# Patient Record
Sex: MALE | Race: WHITE | Employment: STUDENT | URBAN - METROPOLITAN AREA
[De-identification: names, ages, dates, MRNs, and addresses within clinical notes are randomized per-mention and may not be internally consistent; named-entity substitution may affect disease eponyms.]

---
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
## 2021-03-27 ENCOUNTER — OFFICE VISIT (OUTPATIENT)
Dept: FAMILY MEDICINE CLINIC | Facility: CLINIC | Age: 21
End: 2021-03-27
Payer: COMMERCIAL

## 2021-03-27 ENCOUNTER — LAB ENCOUNTER (OUTPATIENT)
Dept: LAB | Facility: HOSPITAL | Age: 21
End: 2021-03-27
Attending: STUDENT IN AN ORGANIZED HEALTH CARE EDUCATION/TRAINING PROGRAM
Payer: COMMERCIAL

## 2021-03-27 VITALS
SYSTOLIC BLOOD PRESSURE: 108 MMHG | HEART RATE: 65 BPM | WEIGHT: 190 LBS | TEMPERATURE: 98 F | DIASTOLIC BLOOD PRESSURE: 60 MMHG | BODY MASS INDEX: 25.18 KG/M2 | HEIGHT: 73 IN

## 2021-03-27 DIAGNOSIS — R41.840 DECREASED ATTENTION SPAN: ICD-10-CM

## 2021-03-27 DIAGNOSIS — R41.840 DECREASED ATTENTION SPAN: Primary | ICD-10-CM

## 2021-03-27 LAB
ALBUMIN SERPL-MCNC: 4.1 G/DL (ref 3.4–5)
ALBUMIN/GLOB SERPL: 1.4 {RATIO} (ref 1–2)
ALP LIVER SERPL-CCNC: 63 U/L
ALT SERPL-CCNC: 41 U/L
ANION GAP SERPL CALC-SCNC: 4 MMOL/L (ref 0–18)
AST SERPL-CCNC: 52 U/L (ref 15–37)
BILIRUB SERPL-MCNC: 0.7 MG/DL (ref 0.1–2)
BUN BLD-MCNC: 10 MG/DL (ref 7–18)
BUN/CREAT SERPL: 7.1 (ref 10–20)
CALCIUM BLD-MCNC: 9.3 MG/DL (ref 8.5–10.1)
CHLORIDE SERPL-SCNC: 106 MMOL/L (ref 98–112)
CO2 SERPL-SCNC: 30 MMOL/L (ref 21–32)
CREAT BLD-MCNC: 1.4 MG/DL
DEPRECATED RDW RBC AUTO: 42.8 FL (ref 35.1–46.3)
ERYTHROCYTE [DISTWIDTH] IN BLOOD BY AUTOMATED COUNT: 13.2 % (ref 11–15)
GLOBULIN PLAS-MCNC: 3 G/DL (ref 2.8–4.4)
GLUCOSE BLD-MCNC: 97 MG/DL (ref 70–99)
HCT VFR BLD AUTO: 46.1 %
HGB BLD-MCNC: 15.7 G/DL
M PROTEIN MFR SERPL ELPH: 7.1 G/DL (ref 6.4–8.2)
MCH RBC QN AUTO: 29.9 PG (ref 26–34)
MCHC RBC AUTO-ENTMCNC: 34.1 G/DL (ref 31–37)
MCV RBC AUTO: 87.8 FL
OSMOLALITY SERPL CALC.SUM OF ELEC: 289 MOSM/KG (ref 275–295)
PATIENT FASTING Y/N/NP: NO
PLATELET # BLD AUTO: 204 10(3)UL (ref 150–450)
POTASSIUM SERPL-SCNC: 4.6 MMOL/L (ref 3.5–5.1)
RBC # BLD AUTO: 5.25 X10(6)UL
SODIUM SERPL-SCNC: 140 MMOL/L (ref 136–145)
TSI SER-ACNC: 2.64 MIU/ML (ref 0.36–3.74)
WBC # BLD AUTO: 5.5 X10(3) UL (ref 4–11)

## 2021-03-27 PROCEDURE — 93005 ELECTROCARDIOGRAM TRACING: CPT

## 2021-03-27 PROCEDURE — 80053 COMPREHEN METABOLIC PANEL: CPT

## 2021-03-27 PROCEDURE — 84443 ASSAY THYROID STIM HORMONE: CPT

## 2021-03-27 PROCEDURE — 85027 COMPLETE CBC AUTOMATED: CPT

## 2021-03-27 PROCEDURE — 99202 OFFICE O/P NEW SF 15 MIN: CPT | Performed by: STUDENT IN AN ORGANIZED HEALTH CARE EDUCATION/TRAINING PROGRAM

## 2021-03-27 PROCEDURE — 3008F BODY MASS INDEX DOCD: CPT | Performed by: STUDENT IN AN ORGANIZED HEALTH CARE EDUCATION/TRAINING PROGRAM

## 2021-03-27 PROCEDURE — 36415 COLL VENOUS BLD VENIPUNCTURE: CPT

## 2021-03-27 PROCEDURE — 93010 ELECTROCARDIOGRAM REPORT: CPT | Performed by: STUDENT IN AN ORGANIZED HEALTH CARE EDUCATION/TRAINING PROGRAM

## 2021-03-27 PROCEDURE — 3078F DIAST BP <80 MM HG: CPT | Performed by: STUDENT IN AN ORGANIZED HEALTH CARE EDUCATION/TRAINING PROGRAM

## 2021-03-27 PROCEDURE — 3074F SYST BP LT 130 MM HG: CPT | Performed by: STUDENT IN AN ORGANIZED HEALTH CARE EDUCATION/TRAINING PROGRAM

## 2021-03-27 NOTE — PROGRESS NOTES
HPI:    Patient ID: Angelita Nguyen is a 21year old male. HPI  Pt presenting with attention concerns.  He states he has had issues with distractibility since approx age 15, recalls meetings between teachers and his parents to address assignments and test appearance. HENT:      Head: Normocephalic. Eyes:      Extraocular Movements: Extraocular movements intact. Conjunctiva/sclera: Conjunctivae normal.      Pupils: Pupils are equal, round, and reactive to light.    Neck:      Thyroid: No thyroid mass Visit:  Requested Prescriptions     Signed Prescriptions Disp Refills   • Amphetamine-Dextroamphet ER (ADDERALL XR) 5 MG Oral Capsule SR 24 Hr 30 capsule 0     Sig: Take 1 capsule (5 mg total) by mouth every morning.        Imaging & Referrals:  EKG 12-LEAD

## 2021-03-28 RX ORDER — DEXTROAMPHETAMINE SACCHARATE, AMPHETAMINE ASPARTATE MONOHYDRATE, DEXTROAMPHETAMINE SULFATE AND AMPHETAMINE SULFATE 1.25; 1.25; 1.25; 1.25 MG/1; MG/1; MG/1; MG/1
5 CAPSULE, EXTENDED RELEASE ORAL EVERY MORNING
Qty: 30 CAPSULE | Refills: 0 | Status: SHIPPED | OUTPATIENT
Start: 2021-03-28 | End: 2021-04-27

## 2021-03-29 ENCOUNTER — VIRTUAL PHONE E/M (OUTPATIENT)
Dept: FAMILY MEDICINE CLINIC | Facility: CLINIC | Age: 21
End: 2021-03-29
Payer: COMMERCIAL

## 2021-03-29 DIAGNOSIS — R41.840 DECREASED ATTENTION SPAN: Primary | ICD-10-CM

## 2021-04-23 NOTE — PROGRESS NOTES
Virtual Telephone Check-In    Livia Ngo verbally consents to a Virtual/Telephone Check-In visit on 04/23/21. Patient has been referred to the [facility] website at [email or web address] to review the yearly Consent to Treat document.     Patient Jag Merino the plan of care above. Coding/billing information is submitted for this visit based on complexity of care and/or time spent for the visit. HPI:    Patient ID: Noe Canada is a 21year old male. HPI  Pt presenting via phone for result review.  Rec agrees with plan. No orders of the defined types were placed in this encounter.       Meds This Visit:  Requested Prescriptions      No prescriptions requested or ordered in this encounter       Imaging & Referrals:  None         BE#[id number]

## 2021-07-26 ENCOUNTER — HOSPITAL ENCOUNTER (OUTPATIENT)
Age: 21
Discharge: HOME | End: 2021-07-26
Payer: COMMERCIAL

## 2021-07-26 VITALS
TEMPERATURE: 99 F | HEIGHT: 73 IN | DIASTOLIC BLOOD PRESSURE: 67 MMHG | OXYGEN SATURATION: 100 % | BODY MASS INDEX: 25.18 KG/M2 | HEART RATE: 65 BPM | WEIGHT: 190 LBS | RESPIRATION RATE: 18 BRPM | SYSTOLIC BLOOD PRESSURE: 129 MMHG

## 2021-07-26 DIAGNOSIS — J02.0 STREPTOCOCCAL SORE THROAT: Primary | ICD-10-CM

## 2021-07-26 LAB
BILIRUB UR QL STRIP: NEGATIVE
CLARITY UR: CLEAR
COLOR UR: YELLOW
GLUCOSE UR STRIP-MCNC: NEGATIVE MG/DL
HGB UR QL STRIP: NEGATIVE
KETONES UR STRIP-MCNC: NEGATIVE MG/DL
LEUKOCYTE ESTERASE UR QL STRIP: NEGATIVE
NITRITE UR QL STRIP: NEGATIVE
PH UR STRIP: 6.5 [PH]
PROT UR STRIP-MCNC: NEGATIVE MG/DL
S PYO AG THROAT QL: POSITIVE
SP GR UR STRIP: >=1.03
UROBILINOGEN UR STRIP-ACNC: <2 MG/DL

## 2021-07-26 PROCEDURE — 81002 URINALYSIS NONAUTO W/O SCOPE: CPT | Performed by: NURSE PRACTITIONER

## 2021-07-26 PROCEDURE — 87880 STREP A ASSAY W/OPTIC: CPT | Performed by: NURSE PRACTITIONER

## 2021-07-26 PROCEDURE — 99203 OFFICE O/P NEW LOW 30 MIN: CPT | Performed by: NURSE PRACTITIONER

## 2021-07-26 RX ORDER — AMOXICILLIN 875 MG/1
875 TABLET, COATED ORAL 2 TIMES DAILY
Qty: 20 TABLET | Refills: 0 | Status: SHIPPED | OUTPATIENT
Start: 2021-07-26 | End: 2021-08-05 | Stop reason: ALTCHOICE

## 2021-07-26 NOTE — ED PROVIDER NOTES
Patient presents with:  Std Screen      HPI:     Vasile Cross is a 21year old male who presents for evaluation of a chief complaint of sore throat for the past week. No difficulty swallowing. Speech is clear. No difficulty breathing or stridor.   He s Needs:       Lack of Transportation (Medical):       Lack of Transportation (Non-Medical):   Physical Activity:       Days of Exercise per Week:       Minutes of Exercise per Session:   Stress:       Feeling of Stress :   Social Connections:       Sim Bro for 10 days.           Dispense:  20 tablet          Refill:  0      Labs performed this visit:  Recent Results (from the past 10 hour(s))   POCT Urinalysis Dipstick    Collection Time: 07/26/21  2:48 PM   Result Value Ref Range    Urine Color Yellow Yellow

## 2021-07-27 LAB
C TRACH DNA SPEC QL NAA+PROBE: NEGATIVE
N GONORRHOEA DNA SPEC QL NAA+PROBE: NEGATIVE

## 2021-08-05 ENCOUNTER — OFFICE VISIT (OUTPATIENT)
Dept: FAMILY MEDICINE CLINIC | Facility: CLINIC | Age: 21
End: 2021-08-05
Payer: COMMERCIAL

## 2021-08-05 VITALS
SYSTOLIC BLOOD PRESSURE: 117 MMHG | TEMPERATURE: 98 F | DIASTOLIC BLOOD PRESSURE: 66 MMHG | WEIGHT: 194 LBS | BODY MASS INDEX: 25.71 KG/M2 | HEART RATE: 66 BPM | HEIGHT: 73 IN

## 2021-08-05 DIAGNOSIS — Z23 IMMUNIZATION DUE: ICD-10-CM

## 2021-08-05 DIAGNOSIS — R41.840 DECREASED ATTENTION SPAN: Primary | ICD-10-CM

## 2021-08-05 PROCEDURE — 90471 IMMUNIZATION ADMIN: CPT | Performed by: STUDENT IN AN ORGANIZED HEALTH CARE EDUCATION/TRAINING PROGRAM

## 2021-08-05 PROCEDURE — 90651 9VHPV VACCINE 2/3 DOSE IM: CPT | Performed by: STUDENT IN AN ORGANIZED HEALTH CARE EDUCATION/TRAINING PROGRAM

## 2021-08-05 PROCEDURE — 3078F DIAST BP <80 MM HG: CPT | Performed by: STUDENT IN AN ORGANIZED HEALTH CARE EDUCATION/TRAINING PROGRAM

## 2021-08-05 PROCEDURE — 3008F BODY MASS INDEX DOCD: CPT | Performed by: STUDENT IN AN ORGANIZED HEALTH CARE EDUCATION/TRAINING PROGRAM

## 2021-08-05 PROCEDURE — 3074F SYST BP LT 130 MM HG: CPT | Performed by: STUDENT IN AN ORGANIZED HEALTH CARE EDUCATION/TRAINING PROGRAM

## 2021-08-05 PROCEDURE — 99213 OFFICE O/P EST LOW 20 MIN: CPT | Performed by: STUDENT IN AN ORGANIZED HEALTH CARE EDUCATION/TRAINING PROGRAM

## 2021-08-05 RX ORDER — DEXTROAMPHETAMINE SACCHARATE, AMPHETAMINE ASPARTATE MONOHYDRATE, DEXTROAMPHETAMINE SULFATE AND AMPHETAMINE SULFATE 1.25; 1.25; 1.25; 1.25 MG/1; MG/1; MG/1; MG/1
5 CAPSULE, EXTENDED RELEASE ORAL EVERY MORNING
COMMUNITY
End: 2021-08-05 | Stop reason: DRUGHIGH

## 2021-08-05 RX ORDER — DEXTROAMPHETAMINE SACCHARATE, AMPHETAMINE ASPARTATE MONOHYDRATE, DEXTROAMPHETAMINE SULFATE AND AMPHETAMINE SULFATE 5; 5; 5; 5 MG/1; MG/1; MG/1; MG/1
20 CAPSULE, EXTENDED RELEASE ORAL EVERY MORNING
Qty: 30 CAPSULE | Refills: 0 | Status: SHIPPED | OUTPATIENT
Start: 2021-08-05 | End: 2021-10-05

## 2021-08-05 NOTE — PROGRESS NOTES
HPI:    Patient ID: Michael Weaver is a 21year old male. HPI  Pt presenting for medication follow-up. H/o decreased attention related to ADD. He was started on Adderall Spring 2021, with minimal improvement.  He takes medication during the week for scho Cervical: No cervical adenopathy. Skin:     General: Skin is warm and dry. Coloration: Skin is not jaundiced. Neurological:      General: No focal deficit present. Mental Status: He is alert and oriented to person, place, and time.  Menta

## 2021-10-05 DIAGNOSIS — R41.840 DECREASED ATTENTION SPAN: ICD-10-CM

## 2021-10-06 DIAGNOSIS — R41.840 DECREASED ATTENTION SPAN: ICD-10-CM

## 2021-10-06 RX ORDER — DEXTROAMPHETAMINE SACCHARATE, AMPHETAMINE ASPARTATE MONOHYDRATE, DEXTROAMPHETAMINE SULFATE AND AMPHETAMINE SULFATE 5; 5; 5; 5 MG/1; MG/1; MG/1; MG/1
20 CAPSULE, EXTENDED RELEASE ORAL EVERY MORNING
Qty: 30 CAPSULE | Refills: 0 | Status: SHIPPED | OUTPATIENT
Start: 2021-10-06 | End: 2021-10-08

## 2021-10-06 RX ORDER — DEXTROAMPHETAMINE SACCHARATE, AMPHETAMINE ASPARTATE MONOHYDRATE, DEXTROAMPHETAMINE SULFATE AND AMPHETAMINE SULFATE 5; 5; 5; 5 MG/1; MG/1; MG/1; MG/1
20 CAPSULE, EXTENDED RELEASE ORAL EVERY MORNING
Qty: 30 CAPSULE | Refills: 0 | OUTPATIENT
Start: 2021-10-06

## 2021-10-06 NOTE — TELEPHONE ENCOUNTER
Please review refill protocol failed/ no protocol  Requested Prescriptions   Pending Prescriptions Disp Refills    Amphetamine-Dextroamphet ER 20 MG Oral Capsule SR 24 Hr 30 capsule 0     Sig: Take 1 capsule (20 mg total) by mouth every morning.         The

## 2021-10-08 RX ORDER — DEXTROAMPHETAMINE SACCHARATE, AMPHETAMINE ASPARTATE MONOHYDRATE, DEXTROAMPHETAMINE SULFATE AND AMPHETAMINE SULFATE 5; 5; 5; 5 MG/1; MG/1; MG/1; MG/1
20 CAPSULE, EXTENDED RELEASE ORAL EVERY MORNING
Qty: 30 CAPSULE | Refills: 0 | Status: SHIPPED | OUTPATIENT
Start: 2021-10-08 | End: 2021-11-06

## 2021-10-08 NOTE — TELEPHONE ENCOUNTER
DR Christopher Alvarez =see medication record, previous order dated 10/6/21 never sent to the pharmacy, pended new script.

## 2021-10-08 NOTE — TELEPHONE ENCOUNTER
Patient is calling regarding refill, states pharmacy has not received. Patient is completely out pf medication. Please call when medication is sent.

## 2021-11-06 DIAGNOSIS — R41.840 DECREASED ATTENTION SPAN: ICD-10-CM

## 2021-11-07 RX ORDER — DEXTROAMPHETAMINE SACCHARATE, AMPHETAMINE ASPARTATE MONOHYDRATE, DEXTROAMPHETAMINE SULFATE AND AMPHETAMINE SULFATE 5; 5; 5; 5 MG/1; MG/1; MG/1; MG/1
20 CAPSULE, EXTENDED RELEASE ORAL EVERY MORNING
Qty: 30 CAPSULE | Refills: 0 | Status: SHIPPED | OUTPATIENT
Start: 2021-11-07 | End: 2021-12-07

## 2021-12-07 DIAGNOSIS — R41.840 DECREASED ATTENTION SPAN: ICD-10-CM

## 2021-12-11 RX ORDER — DEXTROAMPHETAMINE SACCHARATE, AMPHETAMINE ASPARTATE MONOHYDRATE, DEXTROAMPHETAMINE SULFATE AND AMPHETAMINE SULFATE 5; 5; 5; 5 MG/1; MG/1; MG/1; MG/1
20 CAPSULE, EXTENDED RELEASE ORAL EVERY MORNING
Qty: 30 CAPSULE | Refills: 0 | Status: SHIPPED | OUTPATIENT
Start: 2021-12-11 | End: 2022-01-29

## 2022-01-29 DIAGNOSIS — R41.840 DECREASED ATTENTION SPAN: ICD-10-CM

## 2022-01-29 NOTE — TELEPHONE ENCOUNTER
Please review. Protocol failed or has no protocol. Requested Prescriptions   Pending Prescriptions Disp Refills    Amphetamine-Dextroamphet ER 20 MG Oral Capsule SR 24 Hr 30 capsule 0     Sig: Take 1 capsule (20 mg total) by mouth every morning.

## 2022-01-31 RX ORDER — DEXTROAMPHETAMINE SACCHARATE, AMPHETAMINE ASPARTATE MONOHYDRATE, DEXTROAMPHETAMINE SULFATE AND AMPHETAMINE SULFATE 5; 5; 5; 5 MG/1; MG/1; MG/1; MG/1
20 CAPSULE, EXTENDED RELEASE ORAL EVERY MORNING
Qty: 30 CAPSULE | Refills: 0 | Status: SHIPPED | OUTPATIENT
Start: 2022-01-31

## 2022-02-23 DIAGNOSIS — R41.840 DECREASED ATTENTION SPAN: ICD-10-CM

## 2022-02-23 RX ORDER — DEXTROAMPHETAMINE SACCHARATE, AMPHETAMINE ASPARTATE MONOHYDRATE, DEXTROAMPHETAMINE SULFATE AND AMPHETAMINE SULFATE 5; 5; 5; 5 MG/1; MG/1; MG/1; MG/1
20 CAPSULE, EXTENDED RELEASE ORAL EVERY MORNING
Qty: 30 CAPSULE | Refills: 0 | Status: CANCELLED | OUTPATIENT
Start: 2022-02-23

## 2022-02-24 DIAGNOSIS — R41.840 DECREASED ATTENTION SPAN: ICD-10-CM

## 2022-02-24 RX ORDER — DEXTROAMPHETAMINE SACCHARATE, AMPHETAMINE ASPARTATE MONOHYDRATE, DEXTROAMPHETAMINE SULFATE AND AMPHETAMINE SULFATE 6.25; 6.25; 6.25; 6.25 MG/1; MG/1; MG/1; MG/1
25 CAPSULE, EXTENDED RELEASE ORAL EVERY MORNING
Qty: 30 CAPSULE | Refills: 0 | Status: CANCELLED | OUTPATIENT
Start: 2022-02-24

## 2022-04-04 RX ORDER — DEXTROAMPHETAMINE SACCHARATE, AMPHETAMINE ASPARTATE MONOHYDRATE, DEXTROAMPHETAMINE SULFATE AND AMPHETAMINE SULFATE 6.25; 6.25; 6.25; 6.25 MG/1; MG/1; MG/1; MG/1
25 CAPSULE, EXTENDED RELEASE ORAL EVERY MORNING
Qty: 30 CAPSULE | Refills: 0 | Status: SHIPPED | OUTPATIENT
Start: 2022-04-04

## 2022-04-29 NOTE — TELEPHONE ENCOUNTER
Please review. Protocol failed or has no protocol. Requested Prescriptions   Pending Prescriptions Disp Refills    Amphetamine-Dextroamphet ER 25 MG Oral Capsule SR 24 Hr 30 capsule 0     Sig: Take 1 capsule (25 mg total) by mouth every morning.         There is no refill protocol information for this order           Recent Outpatient Visits              8 months ago Decreased attention Span    Yamile Mccoy MD    Office Visit    1 year ago Decreased attention Span    Yamile Mccoy MD    Virtual Phone E/M    1 year ago Decreased attention Span    Yamile Mccoy MD    Office Visit    2 years ago Need for vaccination    [facility], [address], [place]    Nurse Only    3 years ago Pharyngitis, unspecified etiology    [facility] [place], [address], [facility] [place] [place]    Office Visit 3

## 2022-05-01 RX ORDER — DEXTROAMPHETAMINE SACCHARATE, AMPHETAMINE ASPARTATE MONOHYDRATE, DEXTROAMPHETAMINE SULFATE AND AMPHETAMINE SULFATE 6.25; 6.25; 6.25; 6.25 MG/1; MG/1; MG/1; MG/1
25 CAPSULE, EXTENDED RELEASE ORAL EVERY MORNING
Qty: 30 CAPSULE | Refills: 0 | Status: SHIPPED | OUTPATIENT
Start: 2022-05-01

## 2022-05-15 ENCOUNTER — OFFICE VISIT (OUTPATIENT)
Dept: FAMILY MEDICINE CLINIC | Facility: CLINIC | Age: 22
End: 2022-05-15
Payer: COMMERCIAL

## 2022-05-15 VITALS
WEIGHT: 197 LBS | DIASTOLIC BLOOD PRESSURE: 68 MMHG | SYSTOLIC BLOOD PRESSURE: 130 MMHG | BODY MASS INDEX: 25.28 KG/M2 | OXYGEN SATURATION: 100 % | RESPIRATION RATE: 18 BRPM | HEIGHT: 74 IN | HEART RATE: 78 BPM | TEMPERATURE: 100 F

## 2022-05-15 DIAGNOSIS — J11.1 INFLUENZA-LIKE ILLNESS: Primary | ICD-10-CM

## 2022-05-15 PROCEDURE — 87880 STREP A ASSAY W/OPTIC: CPT | Performed by: NURSE PRACTITIONER

## 2022-05-15 PROCEDURE — 3078F DIAST BP <80 MM HG: CPT | Performed by: NURSE PRACTITIONER

## 2022-05-15 PROCEDURE — 3008F BODY MASS INDEX DOCD: CPT | Performed by: NURSE PRACTITIONER

## 2022-05-15 PROCEDURE — 99213 OFFICE O/P EST LOW 20 MIN: CPT | Performed by: NURSE PRACTITIONER

## 2022-05-15 PROCEDURE — 3075F SYST BP GE 130 - 139MM HG: CPT | Performed by: NURSE PRACTITIONER

## 2022-06-08 ENCOUNTER — OFFICE VISIT (OUTPATIENT)
Dept: FAMILY MEDICINE CLINIC | Facility: CLINIC | Age: 22
End: 2022-06-08
Payer: COMMERCIAL

## 2022-06-08 VITALS
SYSTOLIC BLOOD PRESSURE: 135 MMHG | RESPIRATION RATE: 16 BRPM | DIASTOLIC BLOOD PRESSURE: 83 MMHG | BODY MASS INDEX: 24.77 KG/M2 | HEIGHT: 74 IN | HEART RATE: 80 BPM | WEIGHT: 193 LBS

## 2022-06-08 DIAGNOSIS — M25.562 CHRONIC PAIN OF LEFT KNEE: ICD-10-CM

## 2022-06-08 DIAGNOSIS — R41.840 DECREASED ATTENTION SPAN: Primary | ICD-10-CM

## 2022-06-08 DIAGNOSIS — G89.29 CHRONIC PAIN OF LEFT KNEE: ICD-10-CM

## 2022-06-08 PROCEDURE — 3008F BODY MASS INDEX DOCD: CPT | Performed by: STUDENT IN AN ORGANIZED HEALTH CARE EDUCATION/TRAINING PROGRAM

## 2022-06-08 PROCEDURE — 3079F DIAST BP 80-89 MM HG: CPT | Performed by: STUDENT IN AN ORGANIZED HEALTH CARE EDUCATION/TRAINING PROGRAM

## 2022-06-08 PROCEDURE — 99214 OFFICE O/P EST MOD 30 MIN: CPT | Performed by: STUDENT IN AN ORGANIZED HEALTH CARE EDUCATION/TRAINING PROGRAM

## 2022-06-08 PROCEDURE — 3075F SYST BP GE 130 - 139MM HG: CPT | Performed by: STUDENT IN AN ORGANIZED HEALTH CARE EDUCATION/TRAINING PROGRAM

## 2022-06-08 RX ORDER — DEXTROAMPHETAMINE SACCHARATE, AMPHETAMINE ASPARTATE MONOHYDRATE, DEXTROAMPHETAMINE SULFATE AND AMPHETAMINE SULFATE 3.75; 3.75; 3.75; 3.75 MG/1; MG/1; MG/1; MG/1
15 CAPSULE, EXTENDED RELEASE ORAL EVERY MORNING
Qty: 30 CAPSULE | Refills: 0 | Status: SHIPPED | OUTPATIENT
Start: 2022-06-08 | End: 2022-07-08

## 2022-07-25 DIAGNOSIS — R41.840 DECREASED ATTENTION SPAN: ICD-10-CM

## 2022-07-25 RX ORDER — DEXTROAMPHETAMINE SACCHARATE, AMPHETAMINE ASPARTATE MONOHYDRATE, DEXTROAMPHETAMINE SULFATE AND AMPHETAMINE SULFATE 6.25; 6.25; 6.25; 6.25 MG/1; MG/1; MG/1; MG/1
25 CAPSULE, EXTENDED RELEASE ORAL EVERY MORNING
Qty: 30 CAPSULE | Refills: 0 | Status: SHIPPED | OUTPATIENT
Start: 2022-07-25

## 2022-07-25 NOTE — LETTER
Date & Time: 6/25/2019, 11:25 PM  Patient: Jerrod Huber  Encounter Provider(s):    Sunita Watson MD       To Whom It May Concern:    Aj Booth was seen and treated in our department on 6/25/2019. He should not return to work until 6/27/19.     If y Hemoglobin A1c acceptable at 7.6, previous was 7.4.

## 2022-08-10 ENCOUNTER — TELEPHONE (OUTPATIENT)
Dept: PEDIATRICS CLINIC | Facility: CLINIC | Age: 22
End: 2022-08-10

## 2022-08-10 NOTE — TELEPHONE ENCOUNTER
Left a message informing the pt that I put his immunization record in the mail to his home address as requested

## 2022-09-05 DIAGNOSIS — R41.840 DECREASED ATTENTION SPAN: ICD-10-CM

## 2022-09-05 RX ORDER — DEXTROAMPHETAMINE SACCHARATE, AMPHETAMINE ASPARTATE MONOHYDRATE, DEXTROAMPHETAMINE SULFATE AND AMPHETAMINE SULFATE 6.25; 6.25; 6.25; 6.25 MG/1; MG/1; MG/1; MG/1
25 CAPSULE, EXTENDED RELEASE ORAL EVERY MORNING
Qty: 30 CAPSULE | Refills: 0 | Status: SHIPPED | OUTPATIENT
Start: 2022-09-05

## 2022-09-05 NOTE — TELEPHONE ENCOUNTER
DR Briana Carrillo (on behalf of Dr Mame Bahena )       Please review; protocol failed/no protocol. Requested Prescriptions   Pending Prescriptions Disp Refills    Amphetamine-Dextroamphet ER 25 MG Oral Capsule SR 24 Hr 30 capsule 0     Sig: Take 1 capsule (25 mg total) by mouth every morning.         There is no refill protocol information for this order            Recent Outpatient Visits              2 months ago Decreased attention Span    Blaise Alvarado MD    Office Visit    3 months ago Influenza-like illness    [facility], [facility], [address]    Office Visit    1 year ago Decreased attention Span    Blaise Alvarado MD    Office Visit    1 year ago Decreased attention Span    Blaise Alvarado MD    Virtual Phone E/M    1 year ago Decreased attention Span    Chi Fam Brigitte Norman, MD    Office Visit

## 2022-09-05 NOTE — TELEPHONE ENCOUNTER
Message noted: Chart reviewed and may refill medication as requested. Script sent to listed pharmacy by secure method.     Pt notified through [facility]    Future refills as per Dr Ottoniel Cruz

## 2022-10-05 DIAGNOSIS — R41.840 DECREASED ATTENTION SPAN: ICD-10-CM

## 2022-10-05 RX ORDER — DEXTROAMPHETAMINE SACCHARATE, AMPHETAMINE ASPARTATE MONOHYDRATE, DEXTROAMPHETAMINE SULFATE AND AMPHETAMINE SULFATE 6.25; 6.25; 6.25; 6.25 MG/1; MG/1; MG/1; MG/1
25 CAPSULE, EXTENDED RELEASE ORAL EVERY MORNING
Qty: 30 CAPSULE | Refills: 0 | Status: SHIPPED | OUTPATIENT
Start: 2022-10-05

## 2022-10-05 NOTE — TELEPHONE ENCOUNTER
Message noted: Chart reviewed and may refill medication as requested times one. Prescription sent to listed pharmacy. Pharmacy to notify pt.  Further refills as per Dr. Pat Schultz

## 2022-11-06 DIAGNOSIS — R41.840 DECREASED ATTENTION SPAN: ICD-10-CM

## 2022-11-06 RX ORDER — DEXTROAMPHETAMINE SACCHARATE, AMPHETAMINE ASPARTATE MONOHYDRATE, DEXTROAMPHETAMINE SULFATE AND AMPHETAMINE SULFATE 6.25; 6.25; 6.25; 6.25 MG/1; MG/1; MG/1; MG/1
25 CAPSULE, EXTENDED RELEASE ORAL EVERY MORNING
Qty: 30 CAPSULE | Refills: 0 | Status: SHIPPED | OUTPATIENT
Start: 2022-11-06

## 2022-11-06 NOTE — TELEPHONE ENCOUNTER
Controlled medication pending for review. Please change to phone in, fax, or print script if not being sent electronically.     Last Rx: 10/5/22  LOV:  06/08/22

## 2022-12-06 DIAGNOSIS — R41.840 DECREASED ATTENTION SPAN: ICD-10-CM

## 2022-12-08 RX ORDER — DEXTROAMPHETAMINE SACCHARATE, AMPHETAMINE ASPARTATE MONOHYDRATE, DEXTROAMPHETAMINE SULFATE AND AMPHETAMINE SULFATE 6.25; 6.25; 6.25; 6.25 MG/1; MG/1; MG/1; MG/1
25 CAPSULE, EXTENDED RELEASE ORAL EVERY MORNING
Qty: 30 CAPSULE | Refills: 0 | Status: SHIPPED | OUTPATIENT
Start: 2022-12-08

## 2023-01-19 DIAGNOSIS — R41.840 DECREASED ATTENTION SPAN: ICD-10-CM

## 2023-01-20 RX ORDER — DEXTROAMPHETAMINE SACCHARATE, AMPHETAMINE ASPARTATE MONOHYDRATE, DEXTROAMPHETAMINE SULFATE AND AMPHETAMINE SULFATE 6.25; 6.25; 6.25; 6.25 MG/1; MG/1; MG/1; MG/1
25 CAPSULE, EXTENDED RELEASE ORAL EVERY MORNING
Qty: 30 CAPSULE | Refills: 0 | Status: SHIPPED | OUTPATIENT
Start: 2023-01-20

## 2023-01-20 NOTE — TELEPHONE ENCOUNTER
Review pended refill request as it does not fall under a protocol.     Last Rx: 12/8/22  LOV: 6/9/22

## 2023-02-19 DIAGNOSIS — R41.840 DECREASED ATTENTION SPAN: ICD-10-CM

## 2023-02-20 DIAGNOSIS — R41.840 DECREASED ATTENTION SPAN: ICD-10-CM

## 2023-02-20 RX ORDER — DEXTROAMPHETAMINE SACCHARATE, AMPHETAMINE ASPARTATE MONOHYDRATE, DEXTROAMPHETAMINE SULFATE AND AMPHETAMINE SULFATE 6.25; 6.25; 6.25; 6.25 MG/1; MG/1; MG/1; MG/1
25 CAPSULE, EXTENDED RELEASE ORAL EVERY MORNING
Qty: 30 CAPSULE | Refills: 0 | Status: SHIPPED | OUTPATIENT
Start: 2023-02-20

## 2023-04-01 DIAGNOSIS — R41.840 DECREASED ATTENTION SPAN: ICD-10-CM

## 2023-04-05 RX ORDER — DEXTROAMPHETAMINE SACCHARATE, AMPHETAMINE ASPARTATE MONOHYDRATE, DEXTROAMPHETAMINE SULFATE AND AMPHETAMINE SULFATE 6.25; 6.25; 6.25; 6.25 MG/1; MG/1; MG/1; MG/1
25 CAPSULE, EXTENDED RELEASE ORAL EVERY MORNING
Qty: 30 CAPSULE | Refills: 0 | Status: SHIPPED | OUTPATIENT
Start: 2023-04-05

## 2023-05-18 DIAGNOSIS — R41.840 DECREASED ATTENTION SPAN: ICD-10-CM

## 2023-05-18 RX ORDER — DEXTROAMPHETAMINE SACCHARATE, AMPHETAMINE ASPARTATE MONOHYDRATE, DEXTROAMPHETAMINE SULFATE AND AMPHETAMINE SULFATE 6.25; 6.25; 6.25; 6.25 MG/1; MG/1; MG/1; MG/1
25 CAPSULE, EXTENDED RELEASE ORAL EVERY MORNING
Qty: 30 CAPSULE | Refills: 0 | Status: SHIPPED | OUTPATIENT
Start: 2023-05-18

## 2023-05-18 NOTE — TELEPHONE ENCOUNTER
Please review. Protocol Failed or has No Protocol. Requested Prescriptions   Pending Prescriptions Disp Refills    Amphetamine-Dextroamphet ER 25 MG Oral Capsule SR 24 Hr 30 capsule 0     Sig: Take 1 capsule (25 mg total) by mouth every morning.        There is no refill protocol information for this order              Recent Outpatient Visits              11 months ago Decreased attention Span    Chi Sheriff Rowan Grime, MD    Office Visit    1 year ago Influenza-like illness    [facility], [address], [address] [facility],[address], [facility], [address] [place] [address], [employer] Visit    1 year ago Decreased attention Span    Chi Sheriff Rowan Grime, MD    Office Visit    2 years ago Decreased attention Span    Chi Sheriff Rowan Grime, MD    Virtual Phone E/M    2 years ago Decreased attention Span    Chi Sheriff Rowan Grime, MD    Office Visit

## 2023-06-27 DIAGNOSIS — R41.840 DECREASED ATTENTION SPAN: ICD-10-CM

## 2023-06-27 RX ORDER — DEXTROAMPHETAMINE SACCHARATE, AMPHETAMINE ASPARTATE MONOHYDRATE, DEXTROAMPHETAMINE SULFATE AND AMPHETAMINE SULFATE 6.25; 6.25; 6.25; 6.25 MG/1; MG/1; MG/1; MG/1
25 CAPSULE, EXTENDED RELEASE ORAL EVERY MORNING
Qty: 30 CAPSULE | Refills: 0 | Status: SHIPPED | OUTPATIENT
Start: 2023-06-27

## 2023-07-31 DIAGNOSIS — R41.840 DECREASED ATTENTION SPAN: ICD-10-CM

## 2023-08-01 RX ORDER — DEXTROAMPHETAMINE SACCHARATE, AMPHETAMINE ASPARTATE MONOHYDRATE, DEXTROAMPHETAMINE SULFATE AND AMPHETAMINE SULFATE 6.25; 6.25; 6.25; 6.25 MG/1; MG/1; MG/1; MG/1
25 CAPSULE, EXTENDED RELEASE ORAL EVERY MORNING
Qty: 30 CAPSULE | Refills: 0 | Status: SHIPPED | OUTPATIENT
Start: 2023-08-01 | End: 2023-08-04

## 2023-08-01 NOTE — TELEPHONE ENCOUNTER
Please review; protocol failed. Requested Prescriptions   Pending Prescriptions Disp Refills    Amphetamine-Dextroamphet ER 25 MG Oral Capsule SR 24 Hr 30 capsule 0     Sig: Take 1 capsule (25 mg total) by mouth every morning.        There is no refill protocol information for this order        Recent Outpatient Visits              1 year ago Decreased attention Span    Chi Martin Shelton Cavalier, MD    Office Visit    1 year ago Influenza-like illness    [facility], [address], [address] [facility],[address], [facility], [address] [place] [address], [employer] Visit    1 year ago Decreased attention Span    Chi Martin Shelton Cavalier, MD    Office Visit    2 years ago Decreased attention Span    Chi Martin Shelton Cavalier, MD    Virtual Phone E/M    2 years ago Decreased attention Span    Chi Martin Shelton Cavalier, MD    Office Visit

## 2023-08-03 DIAGNOSIS — R41.840 DECREASED ATTENTION SPAN: ICD-10-CM

## 2023-08-04 RX ORDER — DEXTROAMPHETAMINE SACCHARATE, AMPHETAMINE ASPARTATE MONOHYDRATE, DEXTROAMPHETAMINE SULFATE AND AMPHETAMINE SULFATE 6.25; 6.25; 6.25; 6.25 MG/1; MG/1; MG/1; MG/1
25 CAPSULE, EXTENDED RELEASE ORAL EVERY MORNING
Qty: 30 CAPSULE | Refills: 0 | OUTPATIENT
Start: 2023-08-04

## 2023-08-04 NOTE — TELEPHONE ENCOUNTER
Spoke, with the patient and informed him of the message below. Pt did schedule an appointment to see Dr. Fay Ontiveros on 8-26-23.

## 2023-09-07 DIAGNOSIS — R41.840 DECREASED ATTENTION SPAN: ICD-10-CM

## 2023-09-08 RX ORDER — DEXTROAMPHETAMINE SACCHARATE, AMPHETAMINE ASPARTATE MONOHYDRATE, DEXTROAMPHETAMINE SULFATE AND AMPHETAMINE SULFATE 6.25; 6.25; 6.25; 6.25 MG/1; MG/1; MG/1; MG/1
25 CAPSULE, EXTENDED RELEASE ORAL EVERY MORNING
Qty: 30 CAPSULE | Refills: 0 | Status: SHIPPED | OUTPATIENT
Start: 2023-09-08

## 2023-09-08 NOTE — TELEPHONE ENCOUNTER
Please review; protocol failed. Requested Prescriptions   Pending Prescriptions Disp Refills    Amphetamine-Dextroamphet ER 25 MG Oral Capsule SR 24 Hr 30 capsule 0     Sig: Take 1 capsule (25 mg total) by mouth every morning.        There is no refill protocol information for this order        Recent Outpatient Visits              1 year ago Decreased attention Span    Chi Orellana Lehman Don, MD    Office Visit    1 year ago Influenza-like illness    [facility], [address] [place] [address],[address], [facility], [facility]    Office Visit    2 years ago Decreased attention Span    Chi Orellana Lehman Don, MD    Office Visit    2 years ago Decreased attention Span    Chi Orellana Lehman Don, MD    Virtual Phone E/M    2 years ago Decreased attention Span    Chi Orellana Lehman Don, MD    Office Visit          Future Appointments         Provider Department Appt Notes    In 2 weeks Janette Parikh MD [address] [facility] [place] follow up for medication

## 2023-09-23 ENCOUNTER — OFFICE VISIT (OUTPATIENT)
Dept: FAMILY MEDICINE CLINIC | Facility: CLINIC | Age: 23
End: 2023-09-23

## 2023-09-23 ENCOUNTER — LAB ENCOUNTER (OUTPATIENT)
Dept: LAB | Age: 23
End: 2023-09-23
Attending: STUDENT IN AN ORGANIZED HEALTH CARE EDUCATION/TRAINING PROGRAM
Payer: COMMERCIAL

## 2023-09-23 VITALS
OXYGEN SATURATION: 99 % | WEIGHT: 189.81 LBS | SYSTOLIC BLOOD PRESSURE: 117 MMHG | BODY MASS INDEX: 24.36 KG/M2 | HEART RATE: 64 BPM | DIASTOLIC BLOOD PRESSURE: 69 MMHG | HEIGHT: 74 IN | TEMPERATURE: 98 F

## 2023-09-23 DIAGNOSIS — Z00.00 WELL ADULT EXAM: ICD-10-CM

## 2023-09-23 DIAGNOSIS — R41.840 DECREASED ATTENTION SPAN: ICD-10-CM

## 2023-09-23 DIAGNOSIS — Z00.00 WELL ADULT EXAM: Primary | ICD-10-CM

## 2023-09-23 LAB
ALBUMIN SERPL-MCNC: 4.3 G/DL (ref 3.4–5)
ALBUMIN/GLOB SERPL: 1.5 {RATIO} (ref 1–2)
ALP LIVER SERPL-CCNC: 59 U/L
ALT SERPL-CCNC: 49 U/L
ANION GAP SERPL CALC-SCNC: 5 MMOL/L (ref 0–18)
AST SERPL-CCNC: 44 U/L (ref 15–37)
BILIRUB SERPL-MCNC: 0.9 MG/DL (ref 0.1–2)
BUN BLD-MCNC: 16 MG/DL (ref 7–18)
BUN/CREAT SERPL: 12.4 (ref 10–20)
CALCIUM BLD-MCNC: 9.5 MG/DL (ref 8.5–10.1)
CHLORIDE SERPL-SCNC: 111 MMOL/L (ref 98–112)
CHOLEST SERPL-MCNC: 149 MG/DL (ref ?–200)
CO2 SERPL-SCNC: 29 MMOL/L (ref 21–32)
CREAT BLD-MCNC: 1.29 MG/DL
DEPRECATED RDW RBC AUTO: 39.7 FL (ref 35.1–46.3)
EGFRCR SERPLBLD CKD-EPI 2021: 80 ML/MIN/1.73M2 (ref 60–?)
ERYTHROCYTE [DISTWIDTH] IN BLOOD BY AUTOMATED COUNT: 12.6 % (ref 11–15)
FASTING PATIENT LIPID ANSWER: NO
FASTING STATUS PATIENT QL REPORTED: NO
GLOBULIN PLAS-MCNC: 2.8 G/DL (ref 2.8–4.4)
GLUCOSE BLD-MCNC: 76 MG/DL (ref 70–99)
HCT VFR BLD AUTO: 44.5 %
HDLC SERPL-MCNC: 55 MG/DL (ref 40–59)
HGB BLD-MCNC: 15.2 G/DL
LDLC SERPL CALC-MCNC: 77 MG/DL (ref ?–100)
MCH RBC QN AUTO: 29.7 PG (ref 26–34)
MCHC RBC AUTO-ENTMCNC: 34.2 G/DL (ref 31–37)
MCV RBC AUTO: 86.9 FL
NONHDLC SERPL-MCNC: 94 MG/DL (ref ?–130)
OSMOLALITY SERPL CALC.SUM OF ELEC: 300 MOSM/KG (ref 275–295)
PLATELET # BLD AUTO: 229 10(3)UL (ref 150–450)
POTASSIUM SERPL-SCNC: 4.6 MMOL/L (ref 3.5–5.1)
PROT SERPL-MCNC: 7.1 G/DL (ref 6.4–8.2)
RBC # BLD AUTO: 5.12 X10(6)UL
SODIUM SERPL-SCNC: 145 MMOL/L (ref 136–145)
TRIGL SERPL-MCNC: 91 MG/DL (ref 30–149)
TSI SER-ACNC: 1.25 MIU/ML (ref 0.36–3.74)
VLDLC SERPL CALC-MCNC: 14 MG/DL (ref 0–30)
WBC # BLD AUTO: 4.6 X10(3) UL (ref 4–11)

## 2023-09-23 PROCEDURE — 3078F DIAST BP <80 MM HG: CPT | Performed by: STUDENT IN AN ORGANIZED HEALTH CARE EDUCATION/TRAINING PROGRAM

## 2023-09-23 PROCEDURE — 3008F BODY MASS INDEX DOCD: CPT | Performed by: STUDENT IN AN ORGANIZED HEALTH CARE EDUCATION/TRAINING PROGRAM

## 2023-09-23 PROCEDURE — 80061 LIPID PANEL: CPT

## 2023-09-23 PROCEDURE — 99395 PREV VISIT EST AGE 18-39: CPT | Performed by: STUDENT IN AN ORGANIZED HEALTH CARE EDUCATION/TRAINING PROGRAM

## 2023-09-23 PROCEDURE — 36415 COLL VENOUS BLD VENIPUNCTURE: CPT

## 2023-09-23 PROCEDURE — 85027 COMPLETE CBC AUTOMATED: CPT

## 2023-09-23 PROCEDURE — 3074F SYST BP LT 130 MM HG: CPT | Performed by: STUDENT IN AN ORGANIZED HEALTH CARE EDUCATION/TRAINING PROGRAM

## 2023-09-23 PROCEDURE — 80053 COMPREHEN METABOLIC PANEL: CPT

## 2023-09-23 PROCEDURE — 84443 ASSAY THYROID STIM HORMONE: CPT

## 2023-09-23 RX ORDER — DEXTROAMPHETAMINE SACCHARATE, AMPHETAMINE ASPARTATE MONOHYDRATE, DEXTROAMPHETAMINE SULFATE AND AMPHETAMINE SULFATE 6.25; 6.25; 6.25; 6.25 MG/1; MG/1; MG/1; MG/1
25 CAPSULE, EXTENDED RELEASE ORAL EVERY MORNING
Qty: 30 CAPSULE | Refills: 0 | Status: SHIPPED | OUTPATIENT
Start: 2023-12-22

## 2023-09-23 RX ORDER — DEXTROAMPHETAMINE SACCHARATE, AMPHETAMINE ASPARTATE MONOHYDRATE, DEXTROAMPHETAMINE SULFATE AND AMPHETAMINE SULFATE 6.25; 6.25; 6.25; 6.25 MG/1; MG/1; MG/1; MG/1
25 CAPSULE, EXTENDED RELEASE ORAL EVERY MORNING
Qty: 30 CAPSULE | Refills: 0 | Status: SHIPPED | OUTPATIENT
Start: 2023-11-22

## 2023-09-23 RX ORDER — DEXTROAMPHETAMINE SACCHARATE, AMPHETAMINE ASPARTATE MONOHYDRATE, DEXTROAMPHETAMINE SULFATE AND AMPHETAMINE SULFATE 6.25; 6.25; 6.25; 6.25 MG/1; MG/1; MG/1; MG/1
25 CAPSULE, EXTENDED RELEASE ORAL EVERY MORNING
Qty: 30 CAPSULE | Refills: 0 | Status: SHIPPED | OUTPATIENT
Start: 2023-09-23

## 2023-09-23 RX ORDER — DEXTROAMPHETAMINE SACCHARATE, AMPHETAMINE ASPARTATE MONOHYDRATE, DEXTROAMPHETAMINE SULFATE AND AMPHETAMINE SULFATE 6.25; 6.25; 6.25; 6.25 MG/1; MG/1; MG/1; MG/1
25 CAPSULE, EXTENDED RELEASE ORAL EVERY MORNING
Qty: 30 CAPSULE | Refills: 0 | Status: SHIPPED | OUTPATIENT
Start: 2023-10-23

## 2023-09-23 RX ORDER — HYDROCORTISONE ACETATE 25 MG/1
SUPPOSITORY RECTAL
COMMUNITY
Start: 2022-10-21

## 2023-11-11 DIAGNOSIS — R41.840 DECREASED ATTENTION SPAN: ICD-10-CM

## 2023-11-13 RX ORDER — DEXTROAMPHETAMINE SACCHARATE, AMPHETAMINE ASPARTATE MONOHYDRATE, DEXTROAMPHETAMINE SULFATE AND AMPHETAMINE SULFATE 6.25; 6.25; 6.25; 6.25 MG/1; MG/1; MG/1; MG/1
25 CAPSULE, EXTENDED RELEASE ORAL EVERY MORNING
Qty: 30 CAPSULE | Refills: 0 | OUTPATIENT
Start: 2023-11-13

## 2023-12-16 DIAGNOSIS — R41.840 DECREASED ATTENTION SPAN: ICD-10-CM

## 2023-12-17 RX ORDER — DEXTROAMPHETAMINE SACCHARATE, AMPHETAMINE ASPARTATE MONOHYDRATE, DEXTROAMPHETAMINE SULFATE AND AMPHETAMINE SULFATE 6.25; 6.25; 6.25; 6.25 MG/1; MG/1; MG/1; MG/1
25 CAPSULE, EXTENDED RELEASE ORAL EVERY MORNING
Qty: 30 CAPSULE | Refills: 0 | Status: SHIPPED | OUTPATIENT
Start: 2023-12-17

## 2023-12-17 NOTE — TELEPHONE ENCOUNTER
Please review; protocol failed. Requested Prescriptions   Pending Prescriptions Disp Refills    Amphetamine-Dextroamphet ER 25 MG Oral Capsule SR 24 Hr 30 capsule 0     Sig: Take 1 capsule (25 mg total) by mouth every morning.        There is no refill protocol information for this order              Recent Outpatient Visits              2 months ago Well adult exam    [facility] Chi Malloy Lovenia Pier, MD    Office Visit    1 year ago [facility]ased attention Span    [facility]    Office Visit    1 year ago Influenza-like illness    [facility], [address] [place] [address],[address], [facility]    Office Visit    2 years ago Decreased attention Span    [facility] Chi Malloy Lovenia Pier, MD    Office Visit    2 years ago Decreased attention Span    [facility] Chi Malloy Lovenia Pier, MD    Virtual Phone E/M

## 2024-01-29 DIAGNOSIS — R41.840 DECREASED ATTENTION SPAN: ICD-10-CM

## 2024-01-31 RX ORDER — DEXTROAMPHETAMINE SACCHARATE, AMPHETAMINE ASPARTATE MONOHYDRATE, DEXTROAMPHETAMINE SULFATE AND AMPHETAMINE SULFATE 6.25; 6.25; 6.25; 6.25 MG/1; MG/1; MG/1; MG/1
25 CAPSULE, EXTENDED RELEASE ORAL EVERY MORNING
Qty: 30 CAPSULE | Refills: 0 | Status: SHIPPED | OUTPATIENT
Start: 2024-01-31

## 2024-01-31 NOTE — TELEPHONE ENCOUNTER
Please review; No Protocol    Requested Prescriptions   Pending Prescriptions Disp Refills    Amphetamine-Dextroamphet ER 25 MG Oral Capsule SR 24 Hr 30 capsule 0     Sig: Take 1 capsule (25 mg total) by mouth every morning.       There is no refill protocol information for this order          Recent Outpatient Visits              4 months ago Well adult exam    [facility], [facility], Maria Del Carmen Lee MD    Office Visit    1 year ago Decreased attention Span    [facility]    Office Visit    1 year ago Influenza-like illness    [facility], [facility], [facility], Eloisa Cuellar APRN    Office Visit    2 years ago Decreased attention Span    [facility]    Office Visit    2 years ago Decreased attention Span    [facility]    Virtual Phone E/M

## 2024-03-07 DIAGNOSIS — R41.840 DECREASED ATTENTION SPAN: ICD-10-CM

## 2024-03-08 NOTE — TELEPHONE ENCOUNTER
Please review; protocol failed/ has no protocol    Requested Prescriptions   Pending Prescriptions Disp Refills    Amphetamine-Dextroamphet ER 25 MG Oral Capsule SR 24 Hr 30 capsule 0     Sig: Take 1 capsule (25 mg total) by mouth every morning.       Controlled Substance Medication Failed - 3/7/2024 11:14 AM        Failed - This medication is a controlled substance - forward to provider to refill           Recent Outpatient Visits              5 months ago Well adult exam    [facility]    Office Visit    1 year ago Decreased attention Span    [facility]    Office Visit    1 year ago Influenza-like illness    [facility], [facility], [facility], Eloisa Cuellar APRN    Office Visit    2 years ago Decreased attention Span    [facility]    Office Visit    2 years ago Decreased attention Span    [facility]    Virtual Phone E/M

## 2024-03-09 RX ORDER — DEXTROAMPHETAMINE SACCHARATE, AMPHETAMINE ASPARTATE MONOHYDRATE, DEXTROAMPHETAMINE SULFATE AND AMPHETAMINE SULFATE 6.25; 6.25; 6.25; 6.25 MG/1; MG/1; MG/1; MG/1
25 CAPSULE, EXTENDED RELEASE ORAL EVERY MORNING
Qty: 30 CAPSULE | Refills: 0 | Status: SHIPPED | OUTPATIENT
Start: 2024-03-09

## 2024-04-20 DIAGNOSIS — R41.840 DECREASED ATTENTION SPAN: ICD-10-CM

## 2024-04-22 RX ORDER — DEXTROAMPHETAMINE SACCHARATE, AMPHETAMINE ASPARTATE MONOHYDRATE, DEXTROAMPHETAMINE SULFATE AND AMPHETAMINE SULFATE 6.25; 6.25; 6.25; 6.25 MG/1; MG/1; MG/1; MG/1
25 CAPSULE, EXTENDED RELEASE ORAL EVERY MORNING
Qty: 30 CAPSULE | Refills: 0 | Status: SHIPPED | OUTPATIENT
Start: 2024-04-22

## 2024-04-22 NOTE — TELEPHONE ENCOUNTER
Please review; protocol failed/No Protocol    Recent Fills: 12/16/2023, 01/30/2024, 03/07/2024    LOV: 09/23/2023  Future Appointments   Date Time Provider Department Center   5/30/2024  6:00 PM [facility]       Requested Prescriptions   Pending Prescriptions Disp Refills    Amphetamine-Dextroamphet ER 25 MG Oral Capsule SR 24 Hr 30 capsule 0     Sig: Take 1 capsule (25 mg total) by mouth every morning.       Controlled Substance Medication Failed - 4/22/2024  8:23 AM        Failed - This medication is a controlled substance - forward to provider to refill           Future Appointments         Provider Department Appt Notes    In 1 month Maria Del Carmen Loyola MD [facility] Prescription/potential sleeplessness  I'm all good for the most part it just happens sometimes          Recent Outpatient Visits              7 months ago Well adult exam    [facility]    Office Visit    1 year ago Decreased attention Span    [facility]    Office Visit    1 year ago Influenza-like illness    [facility], [facility], [facility], Eloisa Cuellar APRN    Office Visit    2 years ago Decreased attention Span    [facility]    Office Visit    3 years ago Decreased attention Span    [facility]    Virtual Phone E/M

## 2024-04-22 NOTE — TELEPHONE ENCOUNTER
Pt. Called to advise he has scheduled an appointment for 5/30, next available. Patient states he is currently out of medication and needs a refill prior to his appointment. [place] jose Goodman verified.    Medication Quantity Refills Start End   Amphetamine-Dextroamphet ER 25 MG Oral Capsule SR 24 Hr 30 capsule 0 3/9/2024 --   Sig:   Take 1 capsule (25 mg total) by mouth every morning.     Route:   Oral

## 2024-05-30 ENCOUNTER — OFFICE VISIT (OUTPATIENT)
Dept: FAMILY MEDICINE CLINIC | Facility: CLINIC | Age: 24
End: 2024-05-30

## 2024-05-30 VITALS
DIASTOLIC BLOOD PRESSURE: 86 MMHG | RESPIRATION RATE: 18 BRPM | WEIGHT: 185 LBS | HEIGHT: 74 IN | TEMPERATURE: 98 F | OXYGEN SATURATION: 100 % | SYSTOLIC BLOOD PRESSURE: 128 MMHG | BODY MASS INDEX: 23.74 KG/M2 | HEART RATE: 49 BPM

## 2024-05-30 DIAGNOSIS — Z23 IMMUNIZATION DUE: ICD-10-CM

## 2024-05-30 DIAGNOSIS — R41.840 DECREASED ATTENTION SPAN: Primary | ICD-10-CM

## 2024-05-30 PROCEDURE — 90715 TDAP VACCINE 7 YRS/> IM: CPT | Performed by: STUDENT IN AN ORGANIZED HEALTH CARE EDUCATION/TRAINING PROGRAM

## 2024-05-30 PROCEDURE — 3008F BODY MASS INDEX DOCD: CPT | Performed by: STUDENT IN AN ORGANIZED HEALTH CARE EDUCATION/TRAINING PROGRAM

## 2024-05-30 PROCEDURE — 3079F DIAST BP 80-89 MM HG: CPT | Performed by: STUDENT IN AN ORGANIZED HEALTH CARE EDUCATION/TRAINING PROGRAM

## 2024-05-30 PROCEDURE — 3074F SYST BP LT 130 MM HG: CPT | Performed by: STUDENT IN AN ORGANIZED HEALTH CARE EDUCATION/TRAINING PROGRAM

## 2024-05-30 PROCEDURE — 90471 IMMUNIZATION ADMIN: CPT | Performed by: STUDENT IN AN ORGANIZED HEALTH CARE EDUCATION/TRAINING PROGRAM

## 2024-05-30 PROCEDURE — 99212 OFFICE O/P EST SF 10 MIN: CPT | Performed by: STUDENT IN AN ORGANIZED HEALTH CARE EDUCATION/TRAINING PROGRAM

## 2024-05-30 RX ORDER — DEXTROAMPHETAMINE SACCHARATE, AMPHETAMINE ASPARTATE MONOHYDRATE, DEXTROAMPHETAMINE SULFATE AND AMPHETAMINE SULFATE 7.5; 7.5; 7.5; 7.5 MG/1; MG/1; MG/1; MG/1
30 CAPSULE, EXTENDED RELEASE ORAL EVERY MORNING
Qty: 30 CAPSULE | Refills: 0 | Status: SHIPPED | OUTPATIENT
Start: 2024-09-30 | End: 2024-10-30

## 2024-05-30 RX ORDER — DEXTROAMPHETAMINE SACCHARATE, AMPHETAMINE ASPARTATE MONOHYDRATE, DEXTROAMPHETAMINE SULFATE AND AMPHETAMINE SULFATE 7.5; 7.5; 7.5; 7.5 MG/1; MG/1; MG/1; MG/1
30 CAPSULE, EXTENDED RELEASE ORAL EVERY MORNING
Qty: 30 CAPSULE | Refills: 0 | Status: SHIPPED | OUTPATIENT
Start: 2024-08-30 | End: 2024-09-29

## 2024-05-30 RX ORDER — DEXTROAMPHETAMINE SACCHARATE, AMPHETAMINE ASPARTATE MONOHYDRATE, DEXTROAMPHETAMINE SULFATE AND AMPHETAMINE SULFATE 7.5; 7.5; 7.5; 7.5 MG/1; MG/1; MG/1; MG/1
30 CAPSULE, EXTENDED RELEASE ORAL EVERY MORNING
Qty: 30 CAPSULE | Refills: 0 | Status: SHIPPED | OUTPATIENT
Start: 2024-05-30 | End: 2024-06-29

## 2024-05-30 RX ORDER — DEXTROAMPHETAMINE SACCHARATE, AMPHETAMINE ASPARTATE MONOHYDRATE, DEXTROAMPHETAMINE SULFATE AND AMPHETAMINE SULFATE 7.5; 7.5; 7.5; 7.5 MG/1; MG/1; MG/1; MG/1
30 CAPSULE, EXTENDED RELEASE ORAL EVERY MORNING
Qty: 30 CAPSULE | Refills: 0 | Status: SHIPPED | OUTPATIENT
Start: 2024-06-30 | End: 2024-07-30

## 2024-05-30 RX ORDER — DEXTROAMPHETAMINE SACCHARATE, AMPHETAMINE ASPARTATE MONOHYDRATE, DEXTROAMPHETAMINE SULFATE AND AMPHETAMINE SULFATE 7.5; 7.5; 7.5; 7.5 MG/1; MG/1; MG/1; MG/1
30 CAPSULE, EXTENDED RELEASE ORAL EVERY MORNING
Qty: 30 CAPSULE | Refills: 0 | Status: SHIPPED | OUTPATIENT
Start: 2024-07-30 | End: 2024-08-29

## 2024-05-30 RX ORDER — DEXTROAMPHETAMINE SACCHARATE, AMPHETAMINE ASPARTATE MONOHYDRATE, DEXTROAMPHETAMINE SULFATE AND AMPHETAMINE SULFATE 7.5; 7.5; 7.5; 7.5 MG/1; MG/1; MG/1; MG/1
30 CAPSULE, EXTENDED RELEASE ORAL EVERY MORNING
Qty: 30 CAPSULE | Refills: 0 | Status: SHIPPED | OUTPATIENT
Start: 2024-10-30 | End: 2024-11-29

## 2024-05-30 NOTE — PROGRESS NOTES
HPI:    Patient ID: Scott Sanchez is a 23 year old male.    HPI  Pt presenting for follow-up.    Has been working more at work  Anticipated promotion  Notes occasional poor sleep  Denies SH/SI/HI    H/o ADD  Doing well with Adderall dosing  Noted delayed onset, shorter duration  Denies side effects or other concerns    Review of Systems   A comprehensive 10 point review of systems was completed.  Pertinent positives and negatives noted in the the HPI.       Current Outpatient Medications   Medication Sig Dispense Refill    amphetamine-dextroamphetamine ER (ADDERALL XR) 30 MG Oral Capsule SR 24 Hr Take 1 capsule (30 mg total) by mouth every morning. 30 capsule 0    [START ON 6/30/2024] amphetamine-dextroamphetamine ER (ADDERALL XR) 30 MG Oral Capsule SR 24 Hr Take 1 capsule (30 mg total) by mouth every morning. 30 capsule 0    [START ON 7/30/2024] amphetamine-dextroamphetamine ER (ADDERALL XR) 30 MG Oral Capsule SR 24 Hr Take 1 capsule (30 mg total) by mouth every morning. 30 capsule 0    [START ON 8/30/2024] amphetamine-dextroamphetamine ER (ADDERALL XR) 30 MG Oral Capsule SR 24 Hr Take 1 capsule (30 mg total) by mouth every morning. 30 capsule 0    [START ON 9/30/2024] amphetamine-dextroamphetamine ER (ADDERALL XR) 30 MG Oral Capsule SR 24 Hr Take 1 capsule (30 mg total) by mouth every morning. 30 capsule 0    [START ON 10/30/2024] amphetamine-dextroamphetamine ER (ADDERALL XR) 30 MG Oral Capsule SR 24 Hr Take 1 capsule (30 mg total) by mouth every morning. 30 capsule 0    Amphetamine-Dextroamphet ER 25 MG Oral Capsule SR 24 Hr Take 1 capsule (25 mg total) by mouth every morning. 30 capsule 0    Levocetirizine Dihydrochloride (XYZAL) 5 MG Oral Tab Take 1 tablet (5 mg total) by mouth nightly. 30 tablet 0    hydrocortisone 25 MG Rectal Suppos INSERT 1 SUPPOSITORY INTO ANUS AT NIGHTLY AS NEEDED FOR HEMORRHOIDS (Patient not taking: Reported on 5/30/2024)       Allergies:  Allergies   Allergen Reactions    Cats Claw,  Deidra Clements ITCHING      Vitals:    05/30/24 1812   BP: 128/86   Pulse: (!) 49   Resp: 18   Temp: 97.7 °F (36.5 °C)   TempSrc: Temporal   SpO2: 100%   Weight: 185 lb (83.9 kg)   Height: 6' 2\" (1.88 m)       Body mass index is 23.75 kg/m².   PHYSICAL EXAM:   Physical Exam  Vitals reviewed.   Constitutional:       General: He is not in acute distress.     Appearance: Normal appearance.   HENT:      Head: Normocephalic and atraumatic.      Right Ear: External ear normal.      Left Ear: External ear normal.   Eyes:      Conjunctiva/sclera: Conjunctivae normal.   Cardiovascular:      Rate and Rhythm: Normal rate and regular rhythm.      Heart sounds: Normal heart sounds, S1 normal and S2 normal. No murmur heard.  Pulmonary:      Effort: Pulmonary effort is normal. No respiratory distress.      Breath sounds: Normal breath sounds. No wheezing, rhonchi or rales.   Abdominal:      General: Bowel sounds are normal.      Palpations: Abdomen is soft.      Tenderness: There is no abdominal tenderness. There is no guarding or rebound.   Musculoskeletal:      Cervical back: Normal range of motion and neck supple. No muscular tenderness.      Right lower leg: No edema.      Left lower leg: No edema.   Lymphadenopathy:      Cervical: No cervical adenopathy.   Skin:     General: Skin is warm.      Coloration: Skin is not jaundiced.   Neurological:      General: No focal deficit present.      Mental Status: He is alert and oriented to person, place, and time. Mental status is at baseline.   Psychiatric:         Attention and Perception: Attention normal.         Mood and Affect: Mood normal.         Behavior: Behavior normal. Behavior is cooperative.         Cognition and Memory: Cognition normal.             ASSESSMENT/PLAN:   1. Decreased attention Span  Stable  Discussed treatment options -- will increase and monitor response  - discussed red flags for urgent reevaluation  - to call with any questions/concerns  -  amphetamine-dextroamphetamine ER (ADDERALL XR) 30 MG Oral Capsule SR 24 Hr; Take 1 capsule (30 mg total) by mouth every morning.  Dispense: 30 capsule; Refill: 0  - amphetamine-dextroamphetamine ER (ADDERALL XR) 30 MG Oral Capsule SR 24 Hr; Take 1 capsule (30 mg total) by mouth every morning.  Dispense: 30 capsule; Refill: 0  - amphetamine-dextroamphetamine ER (ADDERALL XR) 30 MG Oral Capsule SR 24 Hr; Take 1 capsule (30 mg total) by mouth every morning.  Dispense: 30 capsule; Refill: 0  - amphetamine-dextroamphetamine ER (ADDERALL XR) 30 MG Oral Capsule SR 24 Hr; Take 1 capsule (30 mg total) by mouth every morning.  Dispense: 30 capsule; Refill: 0  - amphetamine-dextroamphetamine ER (ADDERALL XR) 30 MG Oral Capsule SR 24 Hr; Take 1 capsule (30 mg total) by mouth every morning.  Dispense: 30 capsule; Refill: 0  - amphetamine-dextroamphetamine ER (ADDERALL XR) 30 MG Oral Capsule SR 24 Hr; Take 1 capsule (30 mg total) by mouth every morning.  Dispense: 30 capsule; Refill: 0    2. Immunization due  - TdaP (Boostrix) Vaccine (> 7 Y)    Discussed role of magnesium, vit D supplementation for sleep, energy. Pt verbalized understanding and agrees with plan.    Orders Placed This Encounter   Procedures    TdaP (Boostrix) Vaccine (> 7 Y)       Meds This Visit:  Requested Prescriptions     Signed Prescriptions Disp Refills    amphetamine-dextroamphetamine ER (ADDERALL XR) 30 MG Oral Capsule SR 24 Hr 30 capsule 0     Sig: Take 1 capsule (30 mg total) by mouth every morning.    amphetamine-dextroamphetamine ER (ADDERALL XR) 30 MG Oral Capsule SR 24 Hr 30 capsule 0     Sig: Take 1 capsule (30 mg total) by mouth every morning.    amphetamine-dextroamphetamine ER (ADDERALL XR) 30 MG Oral Capsule SR 24 Hr 30 capsule 0     Sig: Take 1 capsule (30 mg total) by mouth every morning.    amphetamine-dextroamphetamine ER (ADDERALL XR) 30 MG Oral Capsule SR 24 Hr 30 capsule 0     Sig: Take 1 capsule (30 mg total) by mouth every morning.     amphetamine-dextroamphetamine ER (ADDERALL XR) 30 MG Oral Capsule SR 24 Hr 30 capsule 0     Sig: Take 1 capsule (30 mg total) by mouth every morning.    amphetamine-dextroamphetamine ER (ADDERALL XR) 30 MG Oral Capsule SR 24 Hr 30 capsule 0     Sig: Take 1 capsule (30 mg total) by mouth every morning.       Imaging & Referrals:  TETANUS, DIPHTHERIA TOXOIDS AND ACELLULAR PERTUSIS VACCINE (TDAP), >7 YEARS, IM USE         ID#[id number]

## 2024-07-10 DIAGNOSIS — R41.840 DECREASED ATTENTION SPAN: ICD-10-CM

## 2024-07-12 RX ORDER — DEXTROAMPHETAMINE SACCHARATE, AMPHETAMINE ASPARTATE MONOHYDRATE, DEXTROAMPHETAMINE SULFATE AND AMPHETAMINE SULFATE 7.5; 7.5; 7.5; 7.5 MG/1; MG/1; MG/1; MG/1
30 CAPSULE, EXTENDED RELEASE ORAL EVERY MORNING
Qty: 30 CAPSULE | Refills: 0 | OUTPATIENT
Start: 2024-07-12 | End: 2024-08-11

## 2024-08-16 DIAGNOSIS — R41.840 DECREASED ATTENTION SPAN: ICD-10-CM

## 2024-08-20 NOTE — TELEPHONE ENCOUNTER
Please review. Protocol Failed; No Protocol      Recent fills: 4/22/2024, 5/30/2024, 7/11/2024  Last Rx written: 5/30/2024  Last office visit: 5/30/2024        Requested Prescriptions   Pending Prescriptions Disp Refills    amphetamine-dextroamphetamine ER (ADDERALL XR) 30 MG Oral Capsule SR 24 Hr 30 capsule 0     Sig: Take 1 capsule (30 mg total) by mouth every morning.       Controlled Substance Medication Failed - 8/16/2024  6:04 PM        Failed - This medication is a controlled substance - forward to provider to refill                 Recent Outpatient Visits              2 months ago Decreased attention Span    [facility]    [facility] Visit    11 months ago Well adult exam    [facility]    Office Visit    2 years ago Decreased attention Span    [facility]    Office Visit    2 years ago Influenza-like illness    [facility], Eloisa Cuellar APRN    Office Visit    3 years ago Decreased attention Span    [facility]    [facility] Visit

## 2024-08-21 RX ORDER — DEXTROAMPHETAMINE SACCHARATE, AMPHETAMINE ASPARTATE MONOHYDRATE, DEXTROAMPHETAMINE SULFATE AND AMPHETAMINE SULFATE 7.5; 7.5; 7.5; 7.5 MG/1; MG/1; MG/1; MG/1
30 CAPSULE, EXTENDED RELEASE ORAL EVERY MORNING
Qty: 30 CAPSULE | Refills: 0 | Status: SHIPPED | OUTPATIENT
Start: 2024-08-21 | End: 2024-09-20

## 2024-09-21 DIAGNOSIS — R41.840 DECREASED ATTENTION SPAN: ICD-10-CM

## 2024-09-26 RX ORDER — DEXTROAMPHETAMINE SACCHARATE, AMPHETAMINE ASPARTATE MONOHYDRATE, DEXTROAMPHETAMINE SULFATE AND AMPHETAMINE SULFATE 7.5; 7.5; 7.5; 7.5 MG/1; MG/1; MG/1; MG/1
30 CAPSULE, EXTENDED RELEASE ORAL EVERY MORNING
Qty: 30 CAPSULE | Refills: 0 | Status: SHIPPED | OUTPATIENT
Start: 2024-09-26 | End: 2024-10-26

## 2024-09-26 NOTE — TELEPHONE ENCOUNTER
Please Review. Protocol Failed; No Protocol   08/19/2024 07/11/2024 05/30/2024  Last written 05/30/2024  Quantity 30   Requested Prescriptions   Pending Prescriptions Disp Refills    amphetamine-dextroamphetamine ER (ADDERALL XR) 30 MG Oral Capsule SR 24 Hr 30 capsule 0     Sig: Take 1 capsule (30 mg total) by mouth every morning.       Controlled Substance Medication Failed - 9/21/2024 10:19 AM        Failed - This medication is a controlled substance - forward to provider to refill                 Recent Outpatient Visits              3 months ago Decreased attention Span    [facility]    Office Visit    1 year ago Well adult exam    [facility]    Office Visit    2 years ago Decreased attention Span    [facility]    Office Visit    2 years ago Influenza-like illness    [facility], [facility], [facility], Eloisa Cuellar APRN    Office Visit    3 years ago Decreased attention Span    [facility]    [facility] Visit

## 2024-11-08 ENCOUNTER — PATIENT MESSAGE (OUTPATIENT)
Dept: ADMINISTRATIVE | Age: 24
End: 2024-11-08

## 2024-11-11 NOTE — TELEPHONE ENCOUNTER
Patient called states he received a call and not sure what it was about--> most recent note seen was related to this IS [employer] message--> soft transfer to Patient . Patient verbalized understanding. No further questions or concerns at this time.

## 2024-11-27 DIAGNOSIS — R41.840 DECREASED ATTENTION SPAN: ICD-10-CM

## 2024-11-27 RX ORDER — DEXTROAMPHETAMINE SACCHARATE, AMPHETAMINE ASPARTATE MONOHYDRATE, DEXTROAMPHETAMINE SULFATE AND AMPHETAMINE SULFATE 7.5; 7.5; 7.5; 7.5 MG/1; MG/1; MG/1; MG/1
30 CAPSULE, EXTENDED RELEASE ORAL EVERY MORNING
Qty: 30 CAPSULE | Refills: 0 | Status: SHIPPED | OUTPATIENT
Start: 2024-12-23

## 2024-11-27 RX ORDER — DEXTROAMPHETAMINE SACCHARATE, AMPHETAMINE ASPARTATE MONOHYDRATE, DEXTROAMPHETAMINE SULFATE AND AMPHETAMINE SULFATE 7.5; 7.5; 7.5; 7.5 MG/1; MG/1; MG/1; MG/1
30 CAPSULE, EXTENDED RELEASE ORAL EVERY MORNING
Qty: 30 CAPSULE | Refills: 0 | Status: SHIPPED | OUTPATIENT
Start: 2024-11-27

## 2024-11-27 NOTE — TELEPHONE ENCOUNTER
Medication(s) to Refill:   Requested Prescriptions     Pending Prescriptions Disp Refills    AMPHETAMINE-DEXTROAMPHETAMINE ER 30 MG Oral Capsule SR 24 Hr [Pharmacy Med Name: Amphetamine/Dextroamphetamine Er 24hr 30 Mg Cap Camb] 30 capsule 0     Sig: Take one capsule by mouth every morning.     Routing high priority- patient states patient is out of medication, tomorrow is the holiday    Reason for Medication Refill being sent to Provider / Reason Protocol Failed:  [x] Controlled Substance       Quantity: 30  Last Time Medication was Filled:  10/24/2024, 09/21/2024, 08/19/2024  Last Time Medication was Written : 09/26/2024      Last Office Visit : 05/30/2024    Future Appointments  Date Type Provider Dept   01/25/25 Appointment Maria Del Carmen Loyola MD [facility]

## 2024-12-28 ENCOUNTER — TELEPHONE (OUTPATIENT)
Dept: FAMILY MEDICINE CLINIC | Facility: CLINIC | Age: 24
End: 2024-12-28

## 2024-12-28 DIAGNOSIS — R41.840 DECREASED ATTENTION SPAN: ICD-10-CM

## 2024-12-31 RX ORDER — DEXTROAMPHETAMINE SACCHARATE, AMPHETAMINE ASPARTATE MONOHYDRATE, DEXTROAMPHETAMINE SULFATE AND AMPHETAMINE SULFATE 7.5; 7.5; 7.5; 7.5 MG/1; MG/1; MG/1; MG/1
30 CAPSULE, EXTENDED RELEASE ORAL EVERY MORNING
Qty: 30 CAPSULE | Refills: 0 | OUTPATIENT
Start: 2024-12-31

## 2025-01-25 ENCOUNTER — OFFICE VISIT (OUTPATIENT)
Dept: FAMILY MEDICINE CLINIC | Facility: CLINIC | Age: 25
End: 2025-01-25

## 2025-01-25 VITALS
HEART RATE: 85 BPM | HEIGHT: 74 IN | WEIGHT: 185 LBS | SYSTOLIC BLOOD PRESSURE: 120 MMHG | BODY MASS INDEX: 23.74 KG/M2 | DIASTOLIC BLOOD PRESSURE: 69 MMHG

## 2025-01-25 DIAGNOSIS — Z00.00 WELL ADULT EXAM: Primary | ICD-10-CM

## 2025-01-25 DIAGNOSIS — R41.840 DECREASED ATTENTION SPAN: ICD-10-CM

## 2025-01-25 RX ORDER — DEXTROAMPHETAMINE SACCHARATE, AMPHETAMINE ASPARTATE MONOHYDRATE, DEXTROAMPHETAMINE SULFATE AND AMPHETAMINE SULFATE 7.5; 7.5; 7.5; 7.5 MG/1; MG/1; MG/1; MG/1
30 CAPSULE, EXTENDED RELEASE ORAL EVERY MORNING
Qty: 30 CAPSULE | Refills: 0 | Status: SHIPPED | OUTPATIENT
Start: 2025-01-25

## 2025-01-25 RX ORDER — DEXTROAMPHETAMINE SACCHARATE, AMPHETAMINE ASPARTATE MONOHYDRATE, DEXTROAMPHETAMINE SULFATE AND AMPHETAMINE SULFATE 7.5; 7.5; 7.5; 7.5 MG/1; MG/1; MG/1; MG/1
30 CAPSULE, EXTENDED RELEASE ORAL EVERY MORNING
Qty: 30 CAPSULE | Refills: 0 | Status: SHIPPED | OUTPATIENT
Start: 2025-03-22

## 2025-01-25 RX ORDER — DEXTROAMPHETAMINE SACCHARATE, AMPHETAMINE ASPARTATE MONOHYDRATE, DEXTROAMPHETAMINE SULFATE AND AMPHETAMINE SULFATE 7.5; 7.5; 7.5; 7.5 MG/1; MG/1; MG/1; MG/1
30 CAPSULE, EXTENDED RELEASE ORAL EVERY MORNING
Qty: 30 CAPSULE | Refills: 0 | Status: SHIPPED | OUTPATIENT
Start: 2025-02-22

## 2025-01-25 NOTE — PROGRESS NOTES
HPI:    Patient ID: Scott Sanchez is a 24 year old male.    HPI  Pt presenting for routine physical exam. Denies any acute issues or recent illnesses. No significant chronic medical problems. Past medical/surgical history, family history, and social history were reviewed.     Recent promotion at work  Mood stable, denies SH/SI/HI    H/o ADD  Stable on Adderall dosing      Review of Systems   A comprehensive 10 point review of systems was completed.  Pertinent positives and negatives noted in the the HPI.       Current Outpatient Medications   Medication Sig Dispense Refill    Multiple Vitamins-Minerals (MENS MULTI VITAMIN & MINERAL) Oral Tab Take by mouth.      amphetamine-dextroamphetamine ER 30 MG Oral Capsule SR 24 Hr Take 1 capsule (30 mg total) by mouth every morning. 30 capsule 0    [START ON 2/22/2025] amphetamine-dextroamphetamine ER 30 MG Oral Capsule SR 24 Hr Take 1 capsule (30 mg total) by mouth every morning. 30 capsule 0    [START ON 3/22/2025] amphetamine-dextroamphetamine ER 30 MG Oral Capsule SR 24 Hr Take 1 capsule (30 mg total) by mouth every morning. 30 capsule 0    Levocetirizine Dihydrochloride (XYZAL) 5 MG Oral Tab Take 1 tablet (5 mg total) by mouth nightly. 30 tablet 0     Allergies:Allergies[1]   Vitals:    01/25/25 0853   BP: 120/69   Pulse: 85   Weight: 185 lb (83.9 kg)   Height: 6' 2\" (1.88 m)       Body mass index is 23.75 kg/m².   PHYSICAL EXAM:   Physical Exam  Vitals reviewed.   Constitutional:       General: He is not in acute distress.     Appearance: Normal appearance.   HENT:      Head: Normocephalic and atraumatic.      Right Ear: Tympanic membrane, ear canal and external ear normal.      Left Ear: Tympanic membrane, ear canal and external ear normal.   Eyes:      Conjunctiva/sclera: Conjunctivae normal.   Cardiovascular:      Rate and Rhythm: Normal rate and regular rhythm.      Heart sounds: Normal heart sounds, S1 normal and S2 normal. No murmur heard.  Pulmonary:       Effort: Pulmonary effort is normal. No respiratory distress.      Breath sounds: Normal breath sounds. No wheezing, rhonchi or rales.   Abdominal:      General: Bowel sounds are normal.      Palpations: Abdomen is soft.      Tenderness: There is no abdominal tenderness. There is no guarding or rebound.   Musculoskeletal:      Cervical back: Normal range of motion and neck supple. No muscular tenderness.      Right lower leg: No edema.      Left lower leg: No edema.   Lymphadenopathy:      Cervical: No cervical adenopathy.   Skin:     General: Skin is warm.      Coloration: Skin is not jaundiced.   Neurological:      General: No focal deficit present.      Mental Status: He is alert and oriented to person, place, and time. Mental status is at baseline.   Psychiatric:         Attention and Perception: Attention normal.         Mood and Affect: Mood normal.         Behavior: Behavior normal. Behavior is cooperative.         Cognition and Memory: Cognition normal.             ASSESSMENT/PLAN:   1. Well adult exam  Discussed preventative screenings  - labs deferred  - encouraged to continue diet/exercise for overall wellness  - increase water intake as able  - follow-up with eye doctor annually  - follow-up with dentist every 6 months  - return yearly for physicals  - annual flu shot -- deferred  - tetanus booster every 10yrs    2. Decreased attention Span  Stable, refilled  I have reviewed the [place] Prescription Monitoring Program. This patient is appropriate for Adderall refill.  - amphetamine-dextroamphetamine ER 30 MG Oral Capsule SR 24 Hr; Take 1 capsule (30 mg total) by mouth every morning.  Dispense: 30 capsule; Refill: 0  - amphetamine-dextroamphetamine ER 30 MG Oral Capsule SR 24 Hr; Take 1 capsule (30 mg total) by mouth every morning.  Dispense: 30 capsule; Refill: 0  - amphetamine-dextroamphetamine ER 30 MG Oral Capsule SR 24 Hr; Take 1 capsule (30 mg total) by mouth every morning.  Dispense: 30 capsule;  Refill: 0    Pt verbalized understanding and agrees with plan.    No orders of the defined types were placed in this encounter.      Meds This Visit:  Requested Prescriptions     Signed Prescriptions Disp Refills    amphetamine-dextroamphetamine ER 30 MG Oral Capsule SR 24 Hr 30 capsule 0     Sig: Take 1 capsule (30 mg total) by mouth every morning.    amphetamine-dextroamphetamine ER 30 MG Oral Capsule SR 24 Hr 30 capsule 0     Sig: Take 1 capsule (30 mg total) by mouth every morning.    amphetamine-dextroamphetamine ER 30 MG Oral Capsule SR 24 Hr 30 capsule 0     Sig: Take 1 capsule (30 mg total) by mouth every morning.       Imaging & Referrals:  None         ID#[id number]       [1]   Allergies  Allergen Reactions    Cats Claw, Uncaria Tomentosa ITCHING

## 2025-01-27 DIAGNOSIS — R41.840 DECREASED ATTENTION SPAN: ICD-10-CM

## 2025-01-31 RX ORDER — DEXTROAMPHETAMINE SACCHARATE, AMPHETAMINE ASPARTATE MONOHYDRATE, DEXTROAMPHETAMINE SULFATE AND AMPHETAMINE SULFATE 7.5; 7.5; 7.5; 7.5 MG/1; MG/1; MG/1; MG/1
30 CAPSULE, EXTENDED RELEASE ORAL EVERY MORNING
Qty: 30 CAPSULE | Refills: 0 | OUTPATIENT
Start: 2025-01-31

## 2025-01-31 NOTE — TELEPHONE ENCOUNTER
Disp Refills Start End    amphetamine-dextroamphetamine ER 30 MG Oral Capsule SR 24 Hr 30 capsule 0 1/25/2025 --    Sig - Route: Take 1 capsule (30 mg total) by mouth every morning. - Oral    Sent to pharmacy as: Amphetamine-Dextroamphet ER 30 MG Oral Capsule Extended Release 24 Hour (Adderall XR)    Earliest Fill Date: 1/25/2025    E-Prescribing Status: Receipt confirmed by pharmacy (1/25/2025  9:18 AM CST)      Associated Diagnoses    Decreased attention Span        Pharmacy    [id number] - [place], [place] - [address] [phone], [phone]

## 2025-04-03 NOTE — TELEPHONE ENCOUNTER
Please review: medication fails/has no protocol attached.    No future appointments.    Recent fill dates: 3/4/25, 1/29/25, and 1/2/25  Date of  last written prescription: 1/25/25   Last written quantity: #30 each and processed as a 30 day supply  Refill date: 04/03/2025  LAST OFFICE VISIT: 1/25/25  [] Takes as needed  [x] Takes scheduled daily

## 2025-06-02 NOTE — TELEPHONE ENCOUNTER
Adderall XR: 30 day supply of refills was sent tot [place] on 04/03/2025.          Outpatient Medication Detail     Disp Refills Start End    amphetamine-dextroamphetamine ER 30 MG Oral Capsule SR 24 Hr 30 capsule 0 5/29/2025 --    Sig - Route: Take 1 capsule (30 mg total) by mouth every morning. - Oral    Sent to pharmacy as: Amphetamine-Dextroamphet ER 30 MG Oral Capsule Extended Release 24 Hour (Adderall XR)    Earliest Fill Date: 5/29/2025    E-Prescribing Status: Receipt confirmed by pharmacy (4/3/2025  1:12 PM CDT)      Associated Diagnoses    Decreased attention Span        Pharmacy    [id number] - [place], [place] - [address] [phone], [phone]

## 2025-07-07 NOTE — TELEPHONE ENCOUNTER
Please review; protocol failed/No Protocol      Recent Fills: 04/03/2025, 05/05/2025, 06/02/2025 #30 for 30 day supply     Last Rx Written: 04/03/2025    Last Office Visit: 01/25/2025

## 2025-07-08 NOTE — TELEPHONE ENCOUNTER
Patient states the [facility] is out of Adderall ER 30 mg and asking if medication can be sent to [facility] in [place]. Patient called to verify that it is in stock there.

## (undated) DIAGNOSIS — R41.840 DECREASED ATTENTION SPAN: ICD-10-CM

## (undated) NOTE — ED AVS SNAPSHOT
Gabriele Jackson   MRN: [id number]    Department:  [facility] Emergency Department   Date of Visit:  6/25/2019           Disclosure     Insurance plans vary and the physician(s) referred by the ER may not be covered by your plan.  Please contact y CARE PHYSICIAN AT ONCE OR RETURN IMMEDIATELY TO THE EMERGENCY DEPARTMENT. If you have been prescribed any medication(s), please fill your prescription right away and begin taking the medication(s) as directed.   If you believe that any of the medications

## (undated) NOTE — ED AVS SNAPSHOT
[facility] in [facility] [address]  [address]""    Phone:  [phone]    Fax:  [phone]           Eldon Abbott   MRN: [id number]    Department:  [facility] in [address] [place] [address]   Date of Visit: Discharge References/Attachments     INFLUENZA  (ENGLISH)      Disclosure     Insurance plans vary and the physician(s) referred by the Immediate Care may not be covered by your plan.   It is possible that the physician may not participate in your health in IF THERE IS ANY CHANGE OR WORSENING OF YOUR CONDITION, CALL YOUR PRIMARY CARE PHYSICIAN AT ONCE OR GO TO THE EMERGENCY DEPARTMENT.     If you have been prescribed any medication(s), please fill your prescription right away and begin taking the medication(s) you to explore options for quitting.     - If you have concerns related to behavioral health issues or thoughts of harming yourself, contact [facility] at [phone].     - If you don’t have insurance, Naye Humphrey

## (undated) NOTE — LETTER
VACCINE ADMINISTRATION RECORD  PARENT / GUARDIAN APPROVAL  Date: 2017  Vaccine administered to: Tila Akins     : 2000    MRN: [id number]    A copy of the appropriate Centers for Disease Control and Prevention Vaccine Information statement

## (undated) NOTE — LETTER
Name:  Leslee Stevens Year:  11th Grade Class: Student ID No.:   Address:  [address] [place] [address] Phone:  [phone] (home)  :  16year old   Name Relationship [address] Work Phone Home Phone Mobile Phone   1.  Solo Dawkins 13. Does anyone in your family have a heart problem, pacemaker, or implanted defibrillator? 12. Has anyone in your family had unexplained fainting, seizures, or near drowning?      BONE AND JOINT QUESTIONS Yes No   17. Have you ever had an injury to a b after being hit or falling? 39.Have you ever been unable to move your arms / legs after being hit /fall? 40. Have you ever become ill while exercising in the heat?     41. Do you get frequent muscle cramps when exercising? 42.  Do you or someone · Murmurs (auscultation standing, supine, +/- Valsalva)  · Location of point of maximal impulse (PMI) Yes    Pulses Yes    Lungs Yes    Abdomen Yes    Genitourinary (males only)* Yes    Skin:  HSV, lesions suggestive of MRSA, tinea corporis Yes    Neurolog may be asked to submit to testing for the presence of performance-enhancing substances in my/his/her body either during [employer] state series events or during the school day, and I/our student do/does hereby agree to submit to such testing and analysis by a ce

## (undated) NOTE — LETTER
ASTHMA ACTION PLAN for Malvin Kothari     : 2000     Date: 21  Doctor:  Phil Edwards MD  Phone for doctor or clinic: [place] , [place], [address] [place] [address]  [place] [id number] [phone]      ACT S walk  Ribs show  Can't talk well Medicine How much to take When to take it    If your symptoms do not improve in ONE hour -  go to the emergency room or call 911 immediately! If symptoms improve, call office for appointment immediately.     Albuterol inhale

## (undated) NOTE — LETTER
[address]  [facility]  Dept: [phone]  Dept Fax: [phone]  Loc: [phone]      March 14, 2017    Patient: Anjelica Nava   Date of Visit: 3/14/2017       To Whom It May Concern:    Rajat Hudson

## (undated) NOTE — LETTER
VACCINE ADMINISTRATION RECORD  PARENT / GUARDIAN APPROVAL  Date: 2019  Vaccine administered to: Jann Cisneros     : 2000    MRN: [id number]    A copy of the appropriate Centers for Disease Control and Prevention Vaccine Information statement h